# Patient Record
Sex: MALE | Race: WHITE | NOT HISPANIC OR LATINO | Employment: FULL TIME | URBAN - METROPOLITAN AREA
[De-identification: names, ages, dates, MRNs, and addresses within clinical notes are randomized per-mention and may not be internally consistent; named-entity substitution may affect disease eponyms.]

---

## 2025-01-08 ENCOUNTER — OFFICE VISIT (OUTPATIENT)
Dept: URGENT CARE | Age: 30
End: 2025-01-08
Payer: COMMERCIAL

## 2025-01-08 ENCOUNTER — ANCILLARY PROCEDURE (OUTPATIENT)
Dept: URGENT CARE | Age: 30
End: 2025-01-08
Payer: COMMERCIAL

## 2025-01-08 VITALS
OXYGEN SATURATION: 97 % | RESPIRATION RATE: 16 BRPM | BODY MASS INDEX: 26.11 KG/M2 | WEIGHT: 210 LBS | HEART RATE: 88 BPM | HEIGHT: 75 IN | TEMPERATURE: 98.4 F | SYSTOLIC BLOOD PRESSURE: 123 MMHG | DIASTOLIC BLOOD PRESSURE: 73 MMHG

## 2025-01-08 DIAGNOSIS — S49.91XA INJURY OF RIGHT SHOULDER, INITIAL ENCOUNTER: ICD-10-CM

## 2025-01-08 DIAGNOSIS — S43.401A SPRAIN OF RIGHT SHOULDER, UNSPECIFIED SHOULDER SPRAIN TYPE, INITIAL ENCOUNTER: ICD-10-CM

## 2025-01-08 DIAGNOSIS — S49.91XA INJURY OF RIGHT SHOULDER, INITIAL ENCOUNTER: Primary | ICD-10-CM

## 2025-01-08 RX ORDER — METHYLPREDNISOLONE 4 MG/1
TABLET ORAL
Qty: 21 TABLET | Refills: 0 | Status: SHIPPED | OUTPATIENT
Start: 2025-01-08 | End: 2025-01-14

## 2025-01-08 RX ORDER — TIZANIDINE HYDROCHLORIDE 4 MG/1
4 CAPSULE, GELATIN COATED ORAL 3 TIMES DAILY
Qty: 21 CAPSULE | Refills: 0 | Status: SHIPPED | OUTPATIENT
Start: 2025-01-08 | End: 2025-01-15

## 2025-01-08 ASSESSMENT — ENCOUNTER SYMPTOMS
ARTHRALGIAS: 1
WEAKNESS: 0
WOUND: 0
COLOR CHANGE: 0
MYALGIAS: 1
JOINT SWELLING: 0
NECK PAIN: 0
NUMBNESS: 1

## 2025-01-08 ASSESSMENT — PATIENT HEALTH QUESTIONNAIRE - PHQ9
1. LITTLE INTEREST OR PLEASURE IN DOING THINGS: NOT AT ALL
2. FEELING DOWN, DEPRESSED OR HOPELESS: NOT AT ALL
SUM OF ALL RESPONSES TO PHQ9 QUESTIONS 1 AND 2: 0

## 2025-01-08 NOTE — PATIENT INSTRUCTIONS
Muscle relaxers can make you drowsy, do not take if you have to work or drive, you can take before bed  Start steroids as instructed on the package, take with food and avoid use with ibuprofen/NSAIDs   Follow up with occupational health

## 2025-01-08 NOTE — PROGRESS NOTES
"Subjective   Patient ID: Mono Francis is a 29 y.o. male. They present today with a chief complaint of Worker's Compensation (Injured right shoulder on Dec. 28 felt a pop while opening a cooler door ).    History of Present Illness  Patient is a 29 year old male presenting for evaluation for right shoulder injury. Symptoms occurred on 12/28 while at work. Went to try and close a door but when he reached out felt a pop in his right anterior shoulder. Has had increasing pain since injury worse with flexion at right elbow feels a tingling in his bicep and pushing or lifting heavy objects. No previous injuries or surgeries to this arm. No issues/pain prior to this incident. No skin changes. Taking ibuprofen and ice without relief. Right hand dominant.       History provided by:  Patient   used: No        Past Medical History  Allergies as of 01/08/2025    (No Known Allergies)       (Not in a hospital admission)       Past Medical History:   Diagnosis Date    Personal history of other diseases of the circulatory system     History of cardiac murmur       Past Surgical History:   Procedure Laterality Date    OTHER SURGICAL HISTORY  12/03/2019    Cyst excision        reports that he has never smoked. He has never been exposed to tobacco smoke. He has never used smokeless tobacco.    Review of Systems  Review of Systems   Musculoskeletal:  Positive for arthralgias and myalgias. Negative for joint swelling and neck pain.   Skin:  Negative for color change and wound.   Neurological:  Positive for numbness. Negative for weakness.                                  Objective    Vitals:    01/08/25 1030   BP: 123/73   BP Location: Left arm   Patient Position: Sitting   BP Cuff Size: Adult   Pulse: 88   Resp: 16   Temp: 36.9 °C (98.4 °F)   TempSrc: Oral   SpO2: 97%   Weight: 95.3 kg (210 lb)   Height: 1.905 m (6' 3\")     No LMP for male patient.    Physical Exam  Constitutional:       General: He is not in " acute distress.     Appearance: Normal appearance. He is normal weight. He is not ill-appearing or toxic-appearing.   HENT:      Head: Normocephalic. No right periorbital erythema or left periorbital erythema.      Jaw: There is normal jaw occlusion. No trismus.   Eyes:      General: No scleral icterus.        Right eye: No discharge.         Left eye: No discharge.      Conjunctiva/sclera: Conjunctivae normal.   Neck:      Trachea: Phonation normal.   Cardiovascular:      Rate and Rhythm: Normal rate and regular rhythm.      Heart sounds: Normal heart sounds. No murmur heard.     No friction rub. No gallop.   Pulmonary:      Effort: Pulmonary effort is normal. No respiratory distress.      Breath sounds: Normal breath sounds. No stridor. No wheezing, rhonchi or rales.   Musculoskeletal:      Comments: Right upper extremity:   Tenderness over right anterior superior chest wall/shoulder and ac joint  No bony deformities   5/5 symmetrical strength of upper extremities   Abduction, adduction, flexion and extension of all digits intact as well as thumb opposition  Supination and pronation of forearm intact  Flexion and extension at elbow intact   Internal and external rotation of shoulder intact but with pain  Cross body adduction intact without pain    Neurological:      General: No focal deficit present.      Mental Status: He is alert.      Gait: Gait normal.   Psychiatric:         Mood and Affect: Mood normal.         Behavior: Behavior normal.         Thought Content: Thought content normal.         Procedures    Point of Care Test & Imaging Results from this visit  No results found for this visit on 01/08/25.   XR shoulder right 2+ views    Result Date: 1/8/2025  Interpreted By:  Reuben John, STUDY: XR SHOULDER RIGHT 2+ VIEWS;  1/8/2025 10:56 am   INDICATION: Signs/Symptoms:right ac joint tenderness.   COMPARISON: None.   ACCESSION NUMBER(S): [id number]   ORDERING CLINICIAN: RICARDO BURGESS   FINDINGS: No acute  fracture or dislocation. Joint spaces appear preserved.       No acute osseous injury of the right shoulder.   MACRO: None   Signed by: Reuben Dora 1/8/2025 11:02 AM Dictation workstation:   [id number]     Diagnostic study results (if any) were reviewed by Martina Beckham PA-C.    Assessment/Plan   Allergies, medications, history, and pertinent labs/EKGs/Imaging reviewed by Martina Beckham PA-C.     Medical Decision Making  Patient is a 29 year old male presenting for evaluation of a right shoulder injury with increasing pain. Hemodynamically stable. Exam as above, no deformity or limited ROM. Neurovascularly intact. XR without bony deformity or widening of the AC joint. Suspect shoulder sprain. Workers comp paperwork filled out. Placed in sling but discussed ROM exercises to prevent frozen shoulder. Follow up with occupational health for further management. Avoid heavy lifting at work/keep sling in place.     At time of discharge, patient was clinically well-appearing and appropriate for outpatient management. The patient/parent/guardian was educated regarding diagnosis, supportive care, OTC and Rx medications. The patient/parent/guardian was given the opportunity to ask questions prior to discharge. They verbalized understanding of discussion of treatment plan, expected course of illness and/or injury, indications on when to return to , when to seek further evaluation in ED/call 911, and the need to follow up with PCP and/or specialist as referred. Patient/parent/guardian was provided with work/school documentation if requested. Patient stable upon discharge.     Orders and Diagnoses  Diagnoses and all orders for this visit:  Injury of right shoulder, initial encounter  -     XR shoulder right 2+ views; Future  Sprain of right shoulder, unspecified shoulder sprain type, initial encounter  -     Sling  -     tiZANidine (Zanaflex) 4 mg capsule; Take 1 capsule (4 mg) by mouth 3 times a day for 7 days.  -      methylPREDNISolone (Medrol Dospak) 4 mg tablets; Follow schedule on package instructions      Medical Admin Record      Patient disposition: Home    Electronically signed by Martina Beckham PA-C  11:23 AM

## 2025-02-04 ENCOUNTER — EVALUATION (OUTPATIENT)
Dept: PHYSICAL THERAPY | Facility: CLINIC | Age: 30
End: 2025-02-04
Payer: COMMERCIAL

## 2025-02-04 DIAGNOSIS — S46.911A STRAIN OF UNSPECIFIED MUSCLE, FASCIA AND TENDON AT SHOULDER AND UPPER ARM LEVEL, RIGHT ARM, INITIAL ENCOUNTER: ICD-10-CM

## 2025-02-04 PROCEDURE — 97110 THERAPEUTIC EXERCISES: CPT | Mod: GP | Performed by: PHYSICAL THERAPIST

## 2025-02-04 PROCEDURE — 97161 PT EVAL LOW COMPLEX 20 MIN: CPT | Mod: GP | Performed by: PHYSICAL THERAPIST

## 2025-02-04 ASSESSMENT — ENCOUNTER SYMPTOMS
LOSS OF SENSATION IN FEET: 0
OCCASIONAL FEELINGS OF UNSTEADINESS: 0
DEPRESSION: 0

## 2025-02-04 ASSESSMENT — COLUMBIA-SUICIDE SEVERITY RATING SCALE - C-SSRS
1. IN THE PAST MONTH, HAVE YOU WISHED YOU WERE DEAD OR WISHED YOU COULD GO TO SLEEP AND NOT WAKE UP?: NO
6. HAVE YOU EVER DONE ANYTHING, STARTED TO DO ANYTHING, OR PREPARED TO DO ANYTHING TO END YOUR LIFE?: NO
2. HAVE YOU ACTUALLY HAD ANY THOUGHTS OF KILLING YOURSELF?: NO

## 2025-02-04 NOTE — PROGRESS NOTES
PHYSICAL THERAPY   EVALUATION & TREATMENT NOTE    Patient Name:  Mono Francis   Patient MRN: [id number]  Date: 2/4/2025    Time Calculation  Start Time: 1038  Stop Time: 1113  Time Calculation (min): 35 min    Insurance:  Insurance Type: [facility]  Visit number: 1  Approved # of visits 12  Authorization Needed: Yes  Cert Date Ends On: 3/14/25    General   Reason for visit: R shoulder strain   DOI 12/28/24  Referred by: Maikol Snyder  S46.911A    Therapy diagnoses:   Problem List Items Addressed This Visit             ICD-10-CM    Strain of unspecified muscle, fascia and tendon at shoulder and upper arm level, right arm, initial encounter S46.911A       ASSESSMENT   30 y/o RHD male c/o R shoulder pain from work injury on 12/28/24 presenting to PT today with decreased R shoulder ROM, decreased R UE strength, (+) Yergason's and symptoms consistent with biceps vs RTC strain. He is limited in his ability to lift anything, perform self care activities with right UE, carry out his tasks at work and perform other ADLs. Patient will benefit from skilled therapy to address these impairments and return to prior level of functioning.     PLAN   Goals  1. Pt will be independent in HEP in 6 weeks  2. Pt will report 0-2/10 R shoulder pain at rest and with activity in 6 weeks.  3. Pt will demonstrate 5/5 B UE strength to return to lifting at work in 6 weeks  4. Pt will demonstrate full R shoulder AROM to improve overhead reaching in 6 weeks.  5. Pt will report quick DASH score < 10% in 6 weeks.    Plan of care to include: therapeutic exercise, therapeutic activity, soft tissue mobilization, joint mobilizations, neuromuscular re-education, pt education, self care activities, home program, vaso/cryotherapy, dry needling    1-2x/week for 6 weeks.    Patient agrees to plan of care.    SUBJECTIVE   Reviewed medical history form with patient and medical screening assessed     Felt a pop in his R shoulder on 12/28/24 while shutting the walk in  cooler door. Shooting pain that went away but then a week later was worsening so he went to get x-rays that were normal.  Has a history of L RTC injury from putting in a dance floor, did 6 months of PT and returned to 100%.   Is RHD    Pain:  R anterior shoulder constant pain, with occasional N/T down biceps  At worst, pain is 6/10  Exacerbating factors include extending his arm, putting it behind his back  At best, pain is 2/10  Relieving factors include ice, ibuprofen as needed    Function:  Has been avoiding most of his housework, will carry his backpack which is only a few pounds. Has a hard time washing his back because he can't reach behind him.   Sleep - interrupted but better than it was, wakes him a few times a week    Work:  at a [employer]. Full time. 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 Is currently on restrictions - no heavy use of his arm or lifting. 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 Weightlifting, some cardio.     Pt goals: improve mobility    Language: English  Potential barriers to treatment: continue to assess    Precautions:    PMH: none  Fall risk -  none      OBJECTIVE *=painful  Observation/Posture  Forward head, rounded shoulders  (-) Cade deformity    Palpation  (+) TTP R biceps worse in long head tendon, pec     Sensation  Decreased to light touch R lateral arm/biceps    Active Range of Motion (R, L in degrees)  Cervical Flexion WNL  Cervical Extension WNL with slight lean to L   Cervical Sidebending WNL, WNL*  Cervical Rotation WNL, WNL*  Shoulder Flexion 115*, 163  Shoulder Abduction 127*, 160  Hand Up Back L2*, T6  Shoulder ER WNL, WNL    Strength (R, L MMT out of 5)  Shoulder Flexion 3*, 5  Shoulder Abduction 3*, 5  Shoulder IR 4, 5  Shoulder ER 4, 5  Elbow Flexion 4*, 5  Elbow Extension  5,  5  Wrist Supination 4*, 5    Special Tests  (+) Mary's    Outcome Measures  Quick DASH = 61%    Today's treatment and initial evaluation included:  - Patient education regarding diagnosis, prognosis, contributing factors, activity modification, symptom monitoring, importance of HEP, role of PT, postural re-education, work ergonomics, body mechanics, reviewed office cancel/no show policy.  - Review of POC   - Therapeutic Exercise & given HEP handout & green band:  Access Code: [email or web address]  - Seated Scapular Retraction  - 20 x daily - 10 reps - 10 sec hold  - Shoulder Flexion Wall Slide with Towel  - 2 x daily - 2 sets - 10 reps - 5 hold  - Door Pec stretch  - 2 x daily - 3 reps - 30 sec  hold  - Standing Shoulder Row with Anchored Resistance  - 1 x daily - 2 sets - 15 reps  - Shoulder extension with resistance - Neutral  - 1 x daily - 2 sets - 15 reps  PT Evaluation Time Entry  PT Evaluation (Low) Time Entry: 25  PT Therapeutic Procedures Time Entry  Therapeutic Exercise Time Entry: 10

## 2025-02-10 ENCOUNTER — TREATMENT (OUTPATIENT)
Dept: PHYSICAL THERAPY | Facility: CLINIC | Age: 30
End: 2025-02-10
Payer: COMMERCIAL

## 2025-02-10 DIAGNOSIS — S46.911A STRAIN OF UNSPECIFIED MUSCLE, FASCIA AND TENDON AT SHOULDER AND UPPER ARM LEVEL, RIGHT ARM, INITIAL ENCOUNTER: Primary | ICD-10-CM

## 2025-02-10 PROCEDURE — 97110 THERAPEUTIC EXERCISES: CPT | Mod: GP,CQ

## 2025-02-10 NOTE — PROGRESS NOTES
PHYSICAL THERAPY   TREATMENT NOTE    Patient Name:  Mono Francis   Patient MRN: [id number]  Date: 2/10/2025    Time Calculation  Start Time: 1145  Stop Time: 1230  Time Calculation (min): 45 min    Insurance:  Insurance Type: [facility]  Visit number: 2    Approved # of visits 12  Authorization Needed: Yes  Cert Date Ends On: 03/14/25    General   Reason for visit: R shoulder strain   DOI 12/28/24  Referred by: Maikol Snyder  S46.911A    Therapy diagnoses:   Problem List Items Addressed This Visit             ICD-10-CM    Strain of unspecified muscle, fascia and tendon at shoulder and upper arm level, right arm, initial encounter - Primary S46.911A     Assessment:    Patient presented to session with 3.5-4/10 pain in the R upper arm region. Indicated increased irritability with doorway pec stretches in 90/90 and OH, but low doorway stretch was more tolerable. Able to begin resisted shoulder ER/IR with moderate fatigue. Did trial increased theraband resistance for mid rows and B shoulder extension with verbal cues for proper posture and technique with slightly elevated irritability overall. Initiated wand IR to increase ROM with good tolerance. Declined ice post treatment. Updated HEP.    Plan:  Continue to address R UE ROM, flexibility, and strength as tolerated.    Subjective:  Patient states he has been feeling better and making progress; yesterday has numbness and tingling in the whole hand - mostly the palm, lasted at least 5 minutes then went away. Has difficulty with getting his hands behind his back to wash.  - Pain (0-10): 0/10 this morning; 3.5-4/10 currently has a good pain tolerance     Precautions:  PMH: none  Fall Risk: none    Objective:    Treatment Performed:   Therapeutic Exercise:  - Access Code: [email or web address]  - UBE : L3 3' fwd/3' retro  - Seated Scapular Retraction 10 x 10 sec hold  - Pulleys fwd/abd x 2 minutes ea  - Shoulder Flexion Wall Slide with Towel 2 x 10 - 5 sec hold  - Door Pec stretch 3 x 30 sec  hold ( low less irritable vs mid/high)   - Shoulder IR with wand 10 x 5 sec hold  - Standing Shoulder Rows 2 x 15 blue  - Shoulder extension 2 x 10 blue  - Shoulder ER/IR 2 x 10 green    Manual Therapy:     Neuromuscular Re-education:     Gait Training:      Modalities: minutes       PT Therapeutic Procedures Time Entry  Therapeutic Exercise Time Entry: 45

## 2025-02-12 ENCOUNTER — TREATMENT (OUTPATIENT)
Dept: PHYSICAL THERAPY | Facility: CLINIC | Age: 30
End: 2025-02-12
Payer: COMMERCIAL

## 2025-02-12 DIAGNOSIS — S46.911A STRAIN OF UNSPECIFIED MUSCLE, FASCIA AND TENDON AT SHOULDER AND UPPER ARM LEVEL, RIGHT ARM, INITIAL ENCOUNTER: ICD-10-CM

## 2025-02-12 PROCEDURE — 97110 THERAPEUTIC EXERCISES: CPT | Mod: GP,CQ

## 2025-02-12 NOTE — PROGRESS NOTES
PHYSICAL THERAPY   TREATMENT NOTE    Patient Name:  Mono Francis   Patient MRN: [id number]  Date: 2/12/2025    Time Calculation  Start Time: 1215  Stop Time: 1300  Time Calculation (min): 45 min    Insurance:  Insurance Type: [facility]  Visit number: 3    Approved # of visits 12  Authorization Needed: Yes  Cert Date Ends On: 03/14/25    General   Reason for visit: R shoulder strain   DOI 12/28/24  Referred by: Maikol Snyder  S46.911A    Therapy diagnoses:   Problem List Items Addressed This Visit             ICD-10-CM    Strain of unspecified muscle, fascia and tendon at shoulder and upper arm level, right arm, initial encounter S46.911A     Assessment:    Patient presented to session with 4/10 pain in the R upper arm region/shoulder. Displayed increased muscular fatigue during theraband strengthening. Initiated shoulder isometric flex and abduction at the wall with verbal cues for sub-max pressure and to not use body to lean into the pressure with slightly increased irritability in abd vs flex. Updated HEP.    Plan:  Continue to address R UE ROM, flexibility, and strength as tolerated.    Subjective:  Patient states he was sore after last session the rest of the day in the R shoulder.  - Pain (0-10): 0/10 this morning; 4/10 currently R upper arm/shoulder    Precautions:  PMH: none  Fall Risk: none    Objective:    Treatment Performed:   Therapeutic Exercise:  - Access Code: [email or web address]  - UBE : L3 3' fwd/3' retro  - Seated Scapular Retraction 10 x 10 sec hold  - Pulleys fwd/abd x 2 minutes ea  - Shoulder Flexion Wall Slide with Towel 2 x 10 - 5 sec hold  - Door Pec stretch low 3 x 30 sec hold  - Shoulder IR with wand 10 x 5 sec hold  - Standing Shoulder Rows 2 x 10 blue  - Shoulder extension 2 x 10 blue  - Shoulder ER/IR 2 x 10 green    Manual Therapy:     Neuromuscular Re-education:     Gait Training:      Modalities: minutes       PT Therapeutic Procedures Time Entry  Therapeutic Exercise Time Entry: 45

## 2025-02-18 ENCOUNTER — TREATMENT (OUTPATIENT)
Dept: PHYSICAL THERAPY | Facility: CLINIC | Age: 30
End: 2025-02-18
Payer: COMMERCIAL

## 2025-02-18 DIAGNOSIS — S46.911A STRAIN OF UNSPECIFIED MUSCLE, FASCIA AND TENDON AT SHOULDER AND UPPER ARM LEVEL, RIGHT ARM, INITIAL ENCOUNTER: ICD-10-CM

## 2025-02-18 PROCEDURE — 97140 MANUAL THERAPY 1/> REGIONS: CPT | Mod: GP | Performed by: PHYSICAL THERAPIST

## 2025-02-18 PROCEDURE — 97016 VASOPNEUMATIC DEVICE THERAPY: CPT | Mod: GP | Performed by: PHYSICAL THERAPIST

## 2025-02-18 PROCEDURE — 97110 THERAPEUTIC EXERCISES: CPT | Mod: GP | Performed by: PHYSICAL THERAPIST

## 2025-02-18 NOTE — PROGRESS NOTES
PHYSICAL THERAPY   TREATMENT NOTE    Patient Name:  Mono Francis   Patient MRN: [id number]  Date: 2/18/2025    Time Calculation  Start Time: 0902  Stop Time: 0943  Time Calculation (min): 41 min    Insurance:  Insurance Type: [facility]  Visit number: 4    Approved # of visits 12  Authorization Needed: Yes  Cert Date Ends On: 03/14/25    General   Reason for visit: R shoulder strain   DOI 12/28/24  Referred by: Maikol Snyder  S46.911A    Therapy diagnoses:   Problem List Items Addressed This Visit             ICD-10-CM    Strain of unspecified muscle, fascia and tendon at shoulder and upper arm level, right arm, initial encounter S46.911A     Assessment:    Pt has poor tolerance today for exercise and manual with TTP around anterior R shoulder and pain at end ranges flexion/abduction/ER/IR. No improvement after session today.    Plan:  Continue to address R UE ROM, flexibility, and strength as tolerated.      Subjective:  Pt says that he saw the doctor who thinks his mobility is getting worse but pt states he doesn't feel like it's getting worse. Is going to get an MRI. Still has a hard time reaching behind his back.   - Pain (0-10): 5/10 this morning, the weather makes it worse.    Precautions:  PMH: none  Fall Risk: none    Objective:  - Access Code: [email or web address]  Treatment Performed:   Therapeutic Exercise:  - UBE L3 x 3' for / 3' rev  - pulleys x 3' abd / 3' flex    Manual Therapy  STM R pec, subscap, UT, LS  PROM R shoulder     Modalities  Vaso 34 degrees mod compression R shoulder x 10'       PT Therapeutic Procedures Time Entry  Manual Therapy Time Entry: 19  Therapeutic Exercise Time Entry: 12  PT Modalities Time Entry  Vasopneumatic Devices Time Entry: 10               no

## 2025-02-21 ENCOUNTER — TREATMENT (OUTPATIENT)
Dept: PHYSICAL THERAPY | Facility: CLINIC | Age: 30
End: 2025-02-21
Payer: COMMERCIAL

## 2025-02-21 DIAGNOSIS — S46.911A STRAIN OF UNSPECIFIED MUSCLE, FASCIA AND TENDON AT SHOULDER AND UPPER ARM LEVEL, RIGHT ARM, INITIAL ENCOUNTER: ICD-10-CM

## 2025-02-21 PROCEDURE — 97110 THERAPEUTIC EXERCISES: CPT | Mod: GP | Performed by: PHYSICAL THERAPIST

## 2025-02-21 PROCEDURE — 97016 VASOPNEUMATIC DEVICE THERAPY: CPT | Mod: GP | Performed by: PHYSICAL THERAPIST

## 2025-02-21 NOTE — PROGRESS NOTES
PHYSICAL THERAPY   TREATMENT NOTE    Patient Name:  Mono Francis   Patient MRN: [id number]  Date: 2/21/2025    Time Calculation  Start Time: 1115  Stop Time: 1200  Time Calculation (min): 45 min    Insurance:  Insurance Type: [facility]  Visit number: 5    Approved # of visits 12  Authorization Needed: Yes  Cert Date Ends On: 03/14/25    General   Reason for visit: R shoulder strain   DOI 12/28/24  Referred by: Maikol Snyder, APRN-CNP  S46.911A    Therapy diagnoses:   Problem List Items Addressed This Visit             ICD-10-CM       Other    Strain of unspecified muscle, fascia and tendon at shoulder and upper arm level, right arm, initial encounter S46.911A       Assessment:  Rotator cuff strengthening with moderate fatigue. Theraband resistance for mid rows and B shoulder extension with verbal cues for proper posture and technique with mildly elevated irritability.     Plan: Continue to address R UE ROM, flexibility, and strength as tolerated.      Subjective:    Pt's symptoms are mostly unchanged. He has difficulty reaching overhead and behind his back.     Patient is schedule for MRI on 2/28/25.    - Pain (0-10): Right anterior shoulder    Precautions:  PMH: none  Fall Risk: none    Objective:  - Access Code: [email or web address]      Treatment Performed:   Therapeutic Exercise:  [35 min]    - UBE L3 x 3' for / 3' rev  - pulleys x 3' abd / 3' flex  - Pulleys fwd/abd x 2 minutes ea  - Shoulder ER/IR 2 x 10 green  - Shoulder Flexion Wall Slide with Towel 2 x 10 - 5 sec hold  - Shoulder Scaption Wall Slide with Towel 2 x 10 - 5 sec hold  - Standing Shoulder Rows 2 x 15 blue  - Shoulder extension 3 x 10 blue        Manual Therapy  STM R pec, subscap, UT, LS  PROM R shoulder     Modalities [10 min]  Vaso 34 degrees mod compression R shoulder x 10'       PT Therapeutic Procedures Time Entry  Therapeutic Exercise Time Entry: 35  PT Modalities Time Entry  Vasopneumatic Devices Time Entry: 10

## 2025-02-24 ENCOUNTER — TREATMENT (OUTPATIENT)
Dept: PHYSICAL THERAPY | Facility: CLINIC | Age: 30
End: 2025-02-24
Payer: COMMERCIAL

## 2025-02-24 DIAGNOSIS — S46.911A STRAIN OF UNSPECIFIED MUSCLE, FASCIA AND TENDON AT SHOULDER AND UPPER ARM LEVEL, RIGHT ARM, INITIAL ENCOUNTER: ICD-10-CM

## 2025-02-26 ENCOUNTER — TREATMENT (OUTPATIENT)
Dept: PHYSICAL THERAPY | Facility: CLINIC | Age: 30
End: 2025-02-26
Payer: COMMERCIAL

## 2025-02-26 DIAGNOSIS — S46.911A STRAIN OF UNSPECIFIED MUSCLE, FASCIA AND TENDON AT SHOULDER AND UPPER ARM LEVEL, RIGHT ARM, INITIAL ENCOUNTER: ICD-10-CM

## 2025-02-26 PROCEDURE — 97140 MANUAL THERAPY 1/> REGIONS: CPT | Mod: GP,CQ

## 2025-02-26 PROCEDURE — 97110 THERAPEUTIC EXERCISES: CPT | Mod: GP,CQ

## 2025-02-26 PROCEDURE — 97016 VASOPNEUMATIC DEVICE THERAPY: CPT | Mod: GP,CQ

## 2025-02-26 NOTE — PROGRESS NOTES
PHYSICAL THERAPY   TREATMENT NOTE    Patient Name:  Mono Francis   Patient MRN: [id number]  Date: 2/26/2025    Time Calculation  Start Time: 0915  Stop Time: 1003  Time Calculation (min): 48 min    Insurance:  Insurance Type: [facility]  Visit number: 6    Approved # of visits 12  Authorization Needed: Yes  Cert Date Ends On: 03/14/25    General   Reason for visit: R shoulder strain   DOI 12/28/24  Referred by: Maikol Snyder, APRN-CNP  S46.911A    Therapy diagnoses:   Problem List Items Addressed This Visit             ICD-10-CM    Strain of unspecified muscle, fascia and tendon at shoulder and upper arm level, right arm, initial encounter S46.911A         Assessment:  Significant point tenderness presented within the R proximal biceps region along with trigger points in the deltoid, biceps, levator, and UT this date. Indicated increased irritability during manual interventions today. Poor tolerance to exercise this date due to higher pain level since waking up this morning; modified theraband resistance and reps to tolerance. Applied game ready to R shoulder to try to decrease pain post exercise.    Plan:   Continue to address R UE ROM, flexibility, and strength as tolerated.    Subjective:  Patient states he thinks he slept on it wrong last night because he has a lot of pain this morning about 7/10 that goes from the front of the shoulder over the top to the back. Patient is scheduled for MRI on 2/28/25.    - Pain (0-10): 7/10 Right anterior shoulder and over the top to the posterior aspect    Precautions:  PMH: none  Fall Risk: none    Objective:  - Access Code: [email or web address]      Treatment Performed:   Therapeutic Exercise:  [30 min]    - UBE L3 x 3' for / 3' rev  - pulleys abd / flex 2' ea  - Shoulder ER/IR 2 x 10 green  - Shoulder Flexion Wall Slide with Towel 2 x 10 - 5 sec hold  - Shoulder Scaption Wall Slide with Towel 2 x 10 - 5 sec hold  - Standing Shoulder Rows 2 x 10 green  - Shoulder extension 2 x 10  green    Manual Therapy: [ 8 min ]  STM/TPR R pec, subscap, UT, LS  PROM R shoulder     Modalities [10 min]  Vaso 34 degrees mod compression R shoulder x 10'       PT Therapeutic Procedures Time Entry  Manual Therapy Time Entry: 8  Therapeutic Exercise Time Entry: 30  PT Modalities Time Entry  Vasopneumatic Devices Time Entry: 10

## 2025-02-28 ENCOUNTER — HOSPITAL ENCOUNTER (OUTPATIENT)
Dept: RADIOLOGY | Facility: HOSPITAL | Age: 30
Discharge: HOME | End: 2025-02-28
Payer: COMMERCIAL

## 2025-02-28 DIAGNOSIS — S46.911A STRAIN OF UNSPECIFIED MUSCLE, FASCIA AND TENDON AT SHOULDER AND UPPER ARM LEVEL, RIGHT ARM, INITIAL ENCOUNTER: ICD-10-CM

## 2025-02-28 PROCEDURE — 73221 MRI JOINT UPR EXTREM W/O DYE: CPT | Mod: RT

## 2025-03-03 ENCOUNTER — TREATMENT (OUTPATIENT)
Dept: PHYSICAL THERAPY | Facility: CLINIC | Age: 30
End: 2025-03-03
Payer: COMMERCIAL

## 2025-03-03 DIAGNOSIS — S46.911A STRAIN OF UNSPECIFIED MUSCLE, FASCIA AND TENDON AT SHOULDER AND UPPER ARM LEVEL, RIGHT ARM, INITIAL ENCOUNTER: ICD-10-CM

## 2025-03-03 PROCEDURE — 97140 MANUAL THERAPY 1/> REGIONS: CPT | Mod: GP,CQ

## 2025-03-03 PROCEDURE — 97110 THERAPEUTIC EXERCISES: CPT | Mod: GP,CQ

## 2025-03-03 PROCEDURE — 97016 VASOPNEUMATIC DEVICE THERAPY: CPT | Mod: GP,CQ

## 2025-03-03 NOTE — PROGRESS NOTES
"PHYSICAL THERAPY   TREATMENT NOTE    Patient Name:  Mono Francis   Patient MRN: [id number]  Date: 3/3/2025    Time Calculation  Start Time: 0830  Stop Time: 0925  Time Calculation (min): 55 min    Insurance:  Insurance Type: [facility]  Visit number: 7    Approved # of visits 12  Authorization Needed: Yes  Cert Date Ends On: 03/14/25    General   Reason for visit: R shoulder strain   DOI 12/28/24  Referred by: Maikol Snyder, APRN-CNP  S46.911A    Therapy diagnoses:   Problem List Items Addressed This Visit             ICD-10-CM    Strain of unspecified muscle, fascia and tendon at shoulder and upper arm level, right arm, initial encounter S46.911A       Assessment:  Patient presented to session with decreasing pain levels vs last session. Able to progress with wall push ups without issue. Increased theraband resistance for RTC strengthening with slight irritability of the R shoulder, but tolerated strengthening progressions well. Initiated prone scapular strengthening with mild to moderate muscular fatigue. Mostly limited with PROM abduction due to pain. Game ready applied post session to decrease exercise soreness.    Plan:   Continue to address R UE ROM, flexibility, and strength as tolerated.    Subjective:  Patient states he has his MRI and nothing major is going on in there, just inflammation. Notices his mobility is getting better. Has an ortho follow up tomorrow.    - Pain (0-10): 3/10 Right anterior shoulder and over the top to the posterior aspect    Precautions:  PMH: none  Fall Risk: none    Objective:  - Access Code: [email or web address]    Treatment Performed:   Therapeutic Exercise:  [35 min]    - UBE L3 x 3' for / 3' rev  - pulleys abd / flex 2' ea  - wall push ups 2 x 10  - Shoulder ER/IR 2 x 10 green  - Shoulder Flexion/Scaption Wall Slides w/Towel 2 x 10 - 5 sec hold  - Mid rows to neutral 2 x 10 black  - Shoulder extension 2 x 10 blue  - prone I and T 3\" 2 x 10 ea  - side lying alphabet x 1    Manual Therapy: [ 10 " min ]  STM/TPR R pec, subscap, UT, LS  PROM R shoulder     Modalities [10 min]  Vaso 34 degrees mod compression R shoulder x 10'       PT Therapeutic Procedures Time Entry  Manual Therapy Time Entry: 10  Therapeutic Exercise Time Entry: 35  PT Modalities Time Entry  Vasopneumatic Devices Time Entry: 10

## 2025-03-04 ENCOUNTER — OFFICE VISIT (OUTPATIENT)
Dept: ORTHOPEDIC SURGERY | Facility: CLINIC | Age: 30
End: 2025-03-04
Payer: COMMERCIAL

## 2025-03-04 VITALS — HEIGHT: 75 IN | BODY MASS INDEX: 26.11 KG/M2 | WEIGHT: 210 LBS

## 2025-03-04 DIAGNOSIS — S46.911A STRAIN OF RIGHT UPPER ARM, INITIAL ENCOUNTER: Primary | ICD-10-CM

## 2025-03-04 PROCEDURE — 99213 OFFICE O/P EST LOW 20 MIN: CPT | Performed by: ORTHOPAEDIC SURGERY

## 2025-03-04 NOTE — LETTER
March 4, 2025     Bin Weston MD  [address]  [facility] [address]    Patient: Mono Francis   YOB: 1995   Date of Visit: 3/4/2025       Dear Dr. Bin Weston MD:    Thank you for referring Mono Francis to me for evaluation. Below are my notes for this consultation.  If you have questions, please do not hesitate to call me. I look forward to following your patient along with you.       Sincerely,     Farooq Barrios MD      CC: No Recipients  ______________________________________________________________________________________    29-year-old referred in consultation from [facility] source.  He injured the right shoulder at work on December 28, 2024 when he was opening a cooler and felt a pop in the shoulder.  He is an .  He has been performing physical therapy with some improvement.  He has been taking ibuprofen and applying ice.  He has difficulty sleeping.    Pleasant and no acute distress.  Right shoulder forward flexion 160°. No effusion or instability. There is positive Neer and Villareal impingement. There is subacromial crepitus and tenderness around the subacromial space. There is biceps tenderness. There is a positive [place]'s test. No acromioclavicular tenderness. Rotator cuff strength is intact but there is discomfort with strength testing. Left shoulder forward flexion 180°. No effusion or instability. No impingement or crepitus or tenderness involving the subacromial space. No biceps or acromioclavicular tenderness. There is intact rotator cuff strength. There is adequate range of motion of the cervical spine without pain. Both upper extremities are well perfused, skin is intact and muscle tone is adequate. Elbow flexion and extension and wrist flexion and extension strength are intact.    Multiple x-ray views of the right shoulder are personally reviewed and there is no acute bony abnormality.    MRI  of the right shoulder is personally reviewed and there is subacromial bursitis.  The rotator cuff is intact.    A discussion about rotator cuff tendinitis was done.  Treatment options were reviewed.  He should continue with further formal physical therapy twice a week over the next 8 weeks as he has been improving and needs additional physical therapy to be able to return to all of the functional demands of his job.  He can use ibuprofen.  If he is having persistent symptoms cortisone injection may also be helpful.

## 2025-03-04 NOTE — PROGRESS NOTES
29-year-old referred in consultation from [facility] source.  He injured the right shoulder at work on December 28, 2024 when he was opening a cooler and felt a pop in the shoulder.  He is an .  He has been performing physical therapy with some improvement.  He has been taking ibuprofen and applying ice.  He has difficulty sleeping.    Pleasant and no acute distress.  Right shoulder forward flexion 160°. No effusion or instability. There is positive Neer and Villareal impingement. There is subacromial crepitus and tenderness around the subacromial space. There is biceps tenderness. There is a positive [place]'s test. No acromioclavicular tenderness. Rotator cuff strength is intact but there is discomfort with strength testing. Left shoulder forward flexion 180°. No effusion or instability. No impingement or crepitus or tenderness involving the subacromial space. No biceps or acromioclavicular tenderness. There is intact rotator cuff strength. There is adequate range of motion of the cervical spine without pain. Both upper extremities are well perfused, skin is intact and muscle tone is adequate. Elbow flexion and extension and wrist flexion and extension strength are intact.    Multiple x-ray views of the right shoulder are personally reviewed and there is no acute bony abnormality.    MRI of the right shoulder is personally reviewed and there is subacromial bursitis.  The rotator cuff is intact.    A discussion about rotator cuff tendinitis was done.  Treatment options were reviewed.  He should continue with further formal physical therapy twice a week over the next 8 weeks as he has been improving and needs additional physical therapy to be able to return to all of the functional demands of his job.  He can use ibuprofen.  If he is having persistent symptoms cortisone injection may also be helpful.

## 2025-03-05 ENCOUNTER — TREATMENT (OUTPATIENT)
Dept: PHYSICAL THERAPY | Facility: CLINIC | Age: 30
End: 2025-03-05
Payer: COMMERCIAL

## 2025-03-05 DIAGNOSIS — S46.911A STRAIN OF UNSPECIFIED MUSCLE, FASCIA AND TENDON AT SHOULDER AND UPPER ARM LEVEL, RIGHT ARM, INITIAL ENCOUNTER: ICD-10-CM

## 2025-03-05 PROCEDURE — 97140 MANUAL THERAPY 1/> REGIONS: CPT | Mod: GP,CQ

## 2025-03-05 PROCEDURE — 97110 THERAPEUTIC EXERCISES: CPT | Mod: GP,CQ

## 2025-03-05 NOTE — PROGRESS NOTES
PHYSICAL THERAPY   TREATMENT NOTE    Patient Name:  Mono Francis   Patient MRN: [id number]  Date: 3/5/2025    Time Calculation  Start Time: 0915  Stop Time: 1000  Time Calculation (min): 45 min    Insurance:  Insurance Type: [facility]  Visit number: 8    Approved # of visits 12  Authorization Needed: Yes  Cert Date Ends On: 03/14/25    General   Reason for visit: R shoulder strain   DOI 12/28/24  Referred by: Maikol Snyder, ISABEL-CNP  S46.911A    Therapy diagnoses:   Problem List Items Addressed This Visit             ICD-10-CM    Strain of unspecified muscle, fascia and tendon at shoulder and upper arm level, right arm, initial encounter S46.911A     Assessment:  Patient presented to session with minimal pain since waking up before session; pain and irritability does increase with exercise in the posterolateral shoulder. Able to progress with serratus wall slides with verbal cues for scapular stability. Prone scapular strengthening is still challenging resulting in mild to moderate muscular fatigue. Had more irritability with PROM shoulder ER and abduction this date. Declined modalities to home.    Plan:   Continue to address R UE ROM, flexibility, and strength as tolerated.    Subjective:  Patient states he saw the ortho and wants more visits for PT and possibly a cortisone injection if symptoms persist. Mild pain so far this morning as he just woke up before he got here.    - Pain (0-10): 1/10 Right anterior shoulder and over the top to the posterior aspect    Precautions:  PMH: none  Fall Risk: none    Objective:  - Access Code: [email or web address]    Treatment Performed:   Therapeutic Exercise:  [35 min]    - UBE L3 x 3' for / 3' rev  - pulleys abd / flex 2' ea  - wall push ups 2 x 10  - pink med ball flexion roll up/down wall 2 x 10  - serratus slides 2 x 10  - Shoulder ER/IR 2 x 10 green  - Wall slides 3 directions with 2# ankle weight 2 x 10  - Mid rows to neutral 2 x 10 black  - Shoulder extension 2 x 10 blue  - prone I  "and T 3\" 2 x 10 ea  - side lying alphabet x 1    Manual Therapy: [ 10 min ]  STM/TPR R pec, subscap, UT, LS  PROM R shoulder     Modalities [0 min]  Vaso 34 degrees mod compression R shoulder x 10' -- declined this date       PT Therapeutic Procedures Time Entry  Manual Therapy Time Entry: 8  Therapeutic Exercise Time Entry: 37                 "

## 2025-03-10 ENCOUNTER — TREATMENT (OUTPATIENT)
Dept: PHYSICAL THERAPY | Facility: CLINIC | Age: 30
End: 2025-03-10
Payer: COMMERCIAL

## 2025-03-10 DIAGNOSIS — S46.911A STRAIN OF UNSPECIFIED MUSCLE, FASCIA AND TENDON AT SHOULDER AND UPPER ARM LEVEL, RIGHT ARM, INITIAL ENCOUNTER: ICD-10-CM

## 2025-03-10 PROCEDURE — 97140 MANUAL THERAPY 1/> REGIONS: CPT | Mod: GP,CQ

## 2025-03-10 PROCEDURE — 97110 THERAPEUTIC EXERCISES: CPT | Mod: GP,CQ

## 2025-03-10 NOTE — PROGRESS NOTES
PHYSICAL THERAPY   TREATMENT NOTE    Patient Name:  Mono Francis   Patient MRN: [id number]  Date: 3/10/2025    Time Calculation  Start Time: 0915  Stop Time: 1000  Time Calculation (min): 45 min    Insurance:  Insurance Type: [facility]  Visit number: 9    Approved # of visits 12  Authorization Needed: Yes  Cert Date Ends On: 03/14/25    General   Reason for visit: R shoulder strain   DOI 12/28/24  Referred by: Maikol Snyder, ISABEL-CNP  S46.911A    Therapy diagnoses:   Problem List Items Addressed This Visit             ICD-10-CM    Strain of unspecified muscle, fascia and tendon at shoulder and upper arm level, right arm, initial encounter S46.911A     Assessment:  Patient presented to session with slightly increased pain in the R anterior shoulder. Progressed with B shoulder ER and B shoulder horizontal abd to promote postural strength with some irritability during shoulder adduction to neutral. Able to add resisted theraband to serratus wall slide with lift off. Completed side lying alphabet without increased pain. Still has increased soreness with PROM shoulder ER and abduction. Declined modalities to home.    Plan:   Continue to address R UE ROM, flexibility, and strength as tolerated.    Subjective:  Patient states he has slightly increased pain this morning between 2-4/10 in the R shoulder.    - Pain (0-10): 2-4/10 Right anterior shoulder and over the top to the posterior aspect    Precautions:  PMH: none  Fall Risk: none    Objective:  - Access Code: [email or web address]    Treatment Performed:   Therapeutic Exercise:  [37 min]    - UBE L3 x 3' for / 3' rev  - pulleys abd / flex 2' ea  - wall push ups 2 x 10  - pink med ball flexion roll up/down wall 2 x 10  - serratus slides 2 x 10 red  - Shoulder ER/IR 2 x 10 green  - B shoulder horizontal abd x 10 blue  - B shoulder ER 2 x 10 blue  - Wall slides 3 directions with 2# ankle weight 2 x 10  - Mid rows to neutral 2 x 10 black  - Shoulder extension 2 x 10 blue  - prone I and T  "3\" 2 x 10 ea  - side lying alphabet x 1    Manual Therapy: [ 8 min ]  STM/TPR R pec, subscap, UT, LS  PROM R shoulder     Modalities [0 min]  Vaso 34 degrees mod compression R shoulder x 10' -- declined this date       PT Therapeutic Procedures Time Entry  Manual Therapy Time Entry: 8  Therapeutic Exercise Time Entry: 37                 "

## 2025-03-12 ENCOUNTER — APPOINTMENT (OUTPATIENT)
Dept: PHYSICAL THERAPY | Facility: CLINIC | Age: 30
End: 2025-03-12
Payer: COMMERCIAL

## 2025-04-01 ENCOUNTER — APPOINTMENT (OUTPATIENT)
Dept: PHYSICAL THERAPY | Facility: CLINIC | Age: 30
End: 2025-04-01
Payer: COMMERCIAL

## 2025-04-04 ENCOUNTER — TREATMENT (OUTPATIENT)
Dept: PHYSICAL THERAPY | Facility: CLINIC | Age: 30
End: 2025-04-04
Payer: COMMERCIAL

## 2025-04-04 DIAGNOSIS — S46.911A STRAIN OF UNSPECIFIED MUSCLE, FASCIA AND TENDON AT SHOULDER AND UPPER ARM LEVEL, RIGHT ARM, INITIAL ENCOUNTER: ICD-10-CM

## 2025-04-04 PROCEDURE — 97110 THERAPEUTIC EXERCISES: CPT | Mod: GP | Performed by: PHYSICAL THERAPIST

## 2025-04-04 PROCEDURE — 97164 PT RE-EVAL EST PLAN CARE: CPT | Mod: GP | Performed by: PHYSICAL THERAPIST

## 2025-04-04 PROCEDURE — 97016 VASOPNEUMATIC DEVICE THERAPY: CPT | Mod: GP | Performed by: PHYSICAL THERAPIST

## 2025-04-04 NOTE — PROGRESS NOTES
PHYSICAL THERAPY   TREATMENT NOTE/PT REASSESSMENT    Patient Name:  Mono Francis   Patient MRN: [id number]  Date: 4/4/2025    Time Calculation  Start Time: 0735  Stop Time: 0817  Time Calculation (min): 42 min    Insurance:  Insurance Type: [facility]  Visit number: 10    Approved # of visits 12  Authorization Needed: Yes  Cert Date Ends On: 03/14/25  PT Reassessment: 4/4/25    General   Reason for visit: R shoulder strain   DOI 12/28/24  Referred by: Maikol Snyder, APRN-CNP  S46.911A    Therapy diagnoses:   Problem List Items Addressed This Visit             ICD-10-CM    Strain of unspecified muscle, fascia and tendon at shoulder and upper arm level, right arm, initial encounter S46.911A       Assessment:  Pt demo's progress towards improving overall AROM, but with weakness with ER/IR with ABD. In addition, pt's functional ability has improved based on QuickDash score. Pt's limitations persist with strength and functional activities with ER especially. Pt reports that shoulder felt better after application of vasopneumatic compression to shoulder.     Goal Assessment:    STG: (new goal added)  Pt will report ability to use R arm for OH activities with full range and with reports of pain <2/10 to meet pt's personal goal in 6 weeks.     LTG: (updated)  1. Pt will be independent in HEP in 6 weeks. Extend 6 more weeks-partially met, pt is independent with HEP at this time  2. Pt will report 0-2/10 R shoulder pain at rest and with activity in 6 weeks. Extend 6 more weeks-partially met, pain has improved, but occasional spikes with R shoulder ABD  3. Pt will demonstrate 5/5 B UE strength to return to lifting at work in 6 weeks-partially met, pt notices with repetitive motion/lifting his shoulder is very sore and weak.  4. Pt will demonstrate full R shoulder AROM to improve overhead reaching in 6 weeks. Extend 6 more weeks-partially met, pt has improved flexion and ABD in supine, but restrictions in ER/IR persist especially  "with ABD.   5. Pt will report quick DASH score < 10% in 6 weeks. Extend 6 more weeks-partially met, pt has improved QuickDash from 61% to 25% of disability.       Plan:   Continue to address R UE ROM, flexibility, and strength as tolerated.    Subjective:  Patient states he has slightly increased pain this morning between 2-4/10 in the R shoulder.    - Pain (0-10): 2-4/10 Right anterior shoulder and over the top to the posterior aspect    Precautions:  PMH: none  Fall Risk: none    Objective:  Outcome Measure: QuickDash 22= 25%    Supine Shoulder AROM:  At Eval in standing:  Shoulder Flexion 115*, 163  Shoulder Abduction 127*, 160  Hand Up Back L2*, T6  Shoulder ER WNL, WNL     At Reassessment in supine:  R shoulder flexion: 160 degrees with empty end feel  R shoulder ABD: 154 degrees with empty end feel'  R shoulder ER with ABD to 90: 10 degrees with empty end feel  R shoulder ER with ABD to 45: 80 degrees with empty end feel    Standing R shoulder IR: T11 at back      Treatment Performed:   Therapeutic Exercise:   - Access Code: [email or web address]  - pulleys abd / flex 3' ea  - UBE L3 x 2' for / 2' rev  - wall push ups 2 x 10    Not today:  - pink med ball flexion roll up/down wall 2 x 10  - serratus slides 2 x 10 red  - Shoulder ER/IR 2 x 10 green  - B shoulder horizontal abd x 10 blue  - B shoulder ER 2 x 10 blue  - Wall slides 3 directions with 2# ankle weight 2 x 10  - Mid rows to neutral 2 x 10 black  - Shoulder extension 2 x 10 blue  - prone I and T 3\" 2 x 10 ea  - side lying alphabet x 1    Modalities  Vasopneumatic compression to R shoulder x 10 min in sitting    PT Evaluation Time Entry  PT Re-Evaluation Time Entry: 15  PT Therapeutic Procedures Time Entry  Therapeutic Exercise Time Entry: 20  PT Modalities Time Entry  Vasopneumatic Devices Time Entry: 10              "

## 2025-04-08 ENCOUNTER — TREATMENT (OUTPATIENT)
Dept: PHYSICAL THERAPY | Facility: CLINIC | Age: 30
End: 2025-04-08
Payer: COMMERCIAL

## 2025-04-08 DIAGNOSIS — S46.911A STRAIN OF UNSPECIFIED MUSCLE, FASCIA AND TENDON AT SHOULDER AND UPPER ARM LEVEL, RIGHT ARM, INITIAL ENCOUNTER: ICD-10-CM

## 2025-04-08 PROCEDURE — 97110 THERAPEUTIC EXERCISES: CPT | Mod: GP | Performed by: PHYSICAL THERAPIST

## 2025-04-08 PROCEDURE — 97140 MANUAL THERAPY 1/> REGIONS: CPT | Mod: GP | Performed by: PHYSICAL THERAPIST

## 2025-04-08 NOTE — PROGRESS NOTES
PHYSICAL THERAPY   TREATMENT NOTE  Patient Name:  Mono Francis   Patient MRN: [id number]  Date: 4/8/2025    Time Calculation  Start Time: 0900  Stop Time: 0945  Time Calculation (min): 45 min    Insurance:  Insurance Type: [facility]  Visit number: Visit count could not be calculated. Make sure you are using a visit which is associated with an episode.    Approved # of visits 16  Authorization Needed: Yes  Cert Date Ends On: 05/7/25  PT Reassessment: 4/4/25    General   Reason for visit: R shoulder strain   DOI 12/28/24  Referred by: Maikol Snyder, APRN-CNP  S46.911A    Therapy diagnoses:   Problem List Items Addressed This Visit             ICD-10-CM       Other    Strain of unspecified muscle, fascia and tendon at shoulder and upper arm level, right arm, initial encounter S46.911A         Assessment: Pt overall doing well and experiencing lower R shoulder pain levels, but continued difficulty with horizontal abduction and shoulder abduction > 90 deg movements. Fatigues fast with elbow plank exercise and wall ball flexion/ abd circles. Requires exercise frequency cues and encouragement for more difficult exercises. R shoulder muscle spasm with shoulder abduction PROM during manual therapy that decreased post STM. Encouraged to try slowly increasing lifting activities at work when pain is low to see how much he can tolerate. Decreased frequency of visits due to pt doing well at this time with good HEP compliance, as well as limited time with his busy work schedule.    Plan:   Continue to address R UE ROM, flexibility, and strength as tolerated. Manual therapy prn to decrease post-session pain. Initiate lifting activities.  1x/wk for 4-6 weeks.      Subjective:  Pt exhausted and working 70 hours a week. Pt shoulder doing well but certain movements still make it worse like horizontal abduction. Pt feels 80% better. Hasn't tried lifting heavy objects yet. Intermittent shoulder pain in morning, but feels alright at end of  "work day. Washing back and doing ADLs doesn't bother.     - Pain (0-10): 0/10 Right anterior shoulder and peeks from 3-6/10 depending on movement     Precautions:  PMH: none  Fall Risk: none    Objective:  - Access Code: [email or web address]    Treatment Performed:   Therapeutic Exercise:   - UBE L3 x 3' for / 3' rev  - incline push ups plus on counter 2 x 10  - wall towel circles R CW/ CCW x 10 each way  - wall red ball circles flex and abd 90 deg 5 CW/CCW x 5 each position R  - standing B shoulder flex with green resisted horizontal abd 2 x 10  - quadruped serratus push x 10  - elbow plank serratus push 5\" x 5    Manual Therapy:  STM/TPR R pec, UT  PROM R shoulder flex, abd, ER    Patient was seen under the guidance of a licensed physical therapist who made all clinical decisions.   Karissa Brito, SPT         PT Therapeutic Procedures Time Entry  Manual Therapy Time Entry: 10  Therapeutic Exercise Time Entry: 35                 "

## 2025-04-11 ENCOUNTER — APPOINTMENT (OUTPATIENT)
Dept: PHYSICAL THERAPY | Facility: CLINIC | Age: 30
End: 2025-04-11
Payer: COMMERCIAL

## 2025-04-15 ENCOUNTER — TREATMENT (OUTPATIENT)
Dept: PHYSICAL THERAPY | Facility: CLINIC | Age: 30
End: 2025-04-15
Payer: COMMERCIAL

## 2025-04-15 DIAGNOSIS — S46.911A STRAIN OF UNSPECIFIED MUSCLE, FASCIA AND TENDON AT SHOULDER AND UPPER ARM LEVEL, RIGHT ARM, INITIAL ENCOUNTER: ICD-10-CM

## 2025-04-15 PROCEDURE — 97110 THERAPEUTIC EXERCISES: CPT | Mod: GP | Performed by: PHYSICAL THERAPIST

## 2025-04-15 NOTE — PROGRESS NOTES
PHYSICAL THERAPY   TREATMENT NOTE  Patient Name:  Mono Francis   Patient MRN: [id number]  Date: 4/15/2025    Time Calculation  Start Time: 0905  Stop Time: 0943  Time Calculation (min): 38 min    Insurance:  Insurance Type: [facility]  Visit number: 3 in this cert   Approved # of visits 16  Authorization Needed: Yes  Cert Date: 3/12-5/7/25    General   Reason for visit: R shoulder strain   DOI 12/28/24  Referred by: Maikol Snyder, APRN-CNP  S46.911A    Therapy diagnoses:   Problem List Items Addressed This Visit             ICD-10-CM    Strain of unspecified muscle, fascia and tendon at shoulder and upper arm level, right arm, initial encounter S46.911A       Assessment: Pt progressing well and demonstrates no increase in shoulder pain levels after exercises - able to produce end range R shoulder motion with no difficulty, except horizontal abduction at 90 degrees. Requires moderate encouragement, exercise frequency, and form cues during session. Challenged particularly by core stability in planking and quadruped activities. Able to tolerate lifting 30# in crate with no difficulty and would benefit from progressing functional activities, core strength, and scapular stability.     Plan:   Continue to address R UE ROM, flexibility, and strength as tolerated. Manual therapy prn to decrease post-session pain. Continue working on lifting activities, core strength, and scapular stabilization exercises. Continue open book stretch for horizontal abduction.       Subjective:  Pt exhausted and working all last night. Tried lifting light objects at work and shoulder felt alright. Pt unable to recall how he felt after last session. No shoulder pain at end of session with end range movements, but still tightness with end range horizontal abduction.     - Pain (0-10): 1/10 Right anterior shoulder     Precautions:  PMH: none  Fall Risk: none    Objective:  - Access Code: [email or web address]    Treatment Performed:   Therapeutic Exercise:   - UBE  "L3 x 3' for / 3' rev  - child's pose on barre 10\" x 5  - doorway stretch mid level 2 x 30\"  - elbow plank dolphin/ serratus push x 15  - primal press up with TA cue 10\" x 10  - supine arm bar 5# kettlebell x 15 R/L  - single arm flexion with green band resisted horizontal abd with PT assist R 2 x 10  - lifting crate activity 30# from shuttle to top of bookcase 3'  - supine open book stretch to the R x 10    Patient was seen under the guidance of a licensed physical therapist who made all clinical decisions.   Karissa Brito, SPT       PT Therapeutic Procedures Time Entry  Therapeutic Exercise Time Entry: 38                 "

## 2025-04-18 ENCOUNTER — APPOINTMENT (OUTPATIENT)
Dept: PHYSICAL THERAPY | Facility: CLINIC | Age: 30
End: 2025-04-18
Payer: COMMERCIAL

## 2025-04-22 ENCOUNTER — TREATMENT (OUTPATIENT)
Dept: PHYSICAL THERAPY | Facility: CLINIC | Age: 30
End: 2025-04-22
Payer: COMMERCIAL

## 2025-04-22 DIAGNOSIS — S46.911A STRAIN OF UNSPECIFIED MUSCLE, FASCIA AND TENDON AT SHOULDER AND UPPER ARM LEVEL, RIGHT ARM, INITIAL ENCOUNTER: ICD-10-CM

## 2025-04-22 PROCEDURE — 97140 MANUAL THERAPY 1/> REGIONS: CPT | Mod: GP | Performed by: PHYSICAL THERAPIST

## 2025-04-22 PROCEDURE — 97110 THERAPEUTIC EXERCISES: CPT | Mod: GP | Performed by: PHYSICAL THERAPIST

## 2025-04-22 NOTE — PROGRESS NOTES
PHYSICAL THERAPY   TREATMENT NOTE  Patient Name:  Mono Francis   Patient MRN: [id number]  Date: 4/22/2025    Time Calculation  Start Time: 0820  Stop Time: 0902  Time Calculation (min): 42 min    Insurance:  Insurance Type: [facility]  Visit number: 4 in this cert   Approved # of visits 16  Authorization Needed: Yes  Cert Date: 3/12-5/7/25    General   Reason for visit: R shoulder strain   DOI 12/28/24  Referred by: Maikol Snyder, APRN-CNP  S46.911A    Therapy diagnoses:   Problem List Items Addressed This Visit           ICD-10-CM    Strain of unspecified muscle, fascia and tendon at shoulder and upper arm level, right arm, initial encounter S46.911A         Assessment: Pt progressing well and demonstrates no increase in shoulder pain levels after exercises. Requires moderate encouragement, exercise frequency, and form cues during session. Pt continues to have difficulty with D2 extension with R shoulder, but able to tolerate band better than cable column. Decrease in pain levels and pec high tone post manual therapy. Offered pt mental health resources, which he declined as he is talking to a mental health professional already.    Plan:   Continue to address R UE ROM, flexibility, and strength as tolerated. Manual therapy prn to decrease post-session pain. Continue working on lifting activities, core strength, and scapular stabilization exercises. Continue open book stretch for horizontal abduction.       Subjective:  Pt doesn't feel well due to allergies. Pt doesn't recall how he felt after last session, but no shoulder discomfort. Back is bothering him more today. Lifted keg ~45-50 lbs at work with no difficulty. Pt reported that he didn't sleep well. Pt reported not feeling mentally stable and has a lot of work stress, but talking to professional.   - Pain (0-10): 0/10 Right anterior shoulder     Precautions:  PMH: none  Fall Risk: none    Objective:  - Access Code: [email or web address]    Treatment Performed:   Therapeutic  "Exercise:   - UBE L3 x 3' for / 3' rev  - elbow plank with rock x 10  - primal press up with TA cue 10\" x 10  - primal press up with blazepod taps 4  - serratus kettlebell 10# arm bar 2 x 8 R  - D2 extension band cable 2.5 x 3*  - cheerleaders yellow band x 20 (added to HEP with red band)  - farmer carries 20# kettlebell in R hand walk around clinic 2'  - B ball toss rebound overhead throw weighted blue ball x 10  - supine open book stretch to the R 4 x 20\"  - child's pose 2 x 30\"    Manual Therapy: at end  STM R UT, pec minor, anterior shoulder  GH distraction grade 1-2    Patient was seen under the guidance of a licensed physical therapist who made all clinical decisions.   Karissa Brito, SPT       PT Therapeutic Procedures Time Entry  Manual Therapy Time Entry: 8  Therapeutic Exercise Time Entry: 34                 "

## 2025-04-25 ENCOUNTER — APPOINTMENT (OUTPATIENT)
Dept: PHYSICAL THERAPY | Facility: CLINIC | Age: 30
End: 2025-04-25
Payer: COMMERCIAL

## 2025-04-29 ENCOUNTER — TREATMENT (OUTPATIENT)
Dept: PHYSICAL THERAPY | Facility: CLINIC | Age: 30
End: 2025-04-29
Payer: COMMERCIAL

## 2025-04-29 DIAGNOSIS — S46.911A STRAIN OF UNSPECIFIED MUSCLE, FASCIA AND TENDON AT SHOULDER AND UPPER ARM LEVEL, RIGHT ARM, INITIAL ENCOUNTER: ICD-10-CM

## 2025-04-29 PROCEDURE — 97110 THERAPEUTIC EXERCISES: CPT | Mod: GP | Performed by: PHYSICAL THERAPIST

## 2025-04-29 PROCEDURE — 97140 MANUAL THERAPY 1/> REGIONS: CPT | Mod: GP | Performed by: PHYSICAL THERAPIST

## 2025-04-29 NOTE — PROGRESS NOTES
PHYSICAL THERAPY   TREATMENT NOTE  Patient Name:  Mono Francis   Patient MRN: [id number]  Date: 4/29/2025    Time Calculation  Start Time: 0815  Stop Time: 0858  Time Calculation (min): 43 min    Insurance:  Insurance Type: [facility]  Visit number: 5 in this cert   Approved # of visits 16  Authorization Needed: Yes  Cert Date: 3/12-5/7/25    General   Reason for visit: R shoulder strain   DOI 12/28/24  Referred by: Maikol Snyder, APRN-CNP  S46.911A    Therapy diagnoses:   Problem List Items Addressed This Visit           ICD-10-CM    Strain of unspecified muscle, fascia and tendon at shoulder and upper arm level, right arm, initial encounter S46.911A     Assessment: Pt progressing well and demonstrates improved tolerance for D2 extension cheerleader exercise. Pt R subscap high tone and tender but decreased post manual. Difficulty with mid rows and prone swims due to shoulder discomfort, but tolerated modification to exercises. Progressed cheerleaders for HEP and educated on using theracane and tennis ball for subscapular self release and increasing sleep as able.     Plan:   Continue to address R UE ROM, flexibility, and strength as tolerated. Manual therapy prn to decrease post-session pain. Continue working on lifting activities, core strength, and scapular stabilization exercises. Continue open book stretch for horizontal abduction.       Subjective:  Pt had a stressful night. Doesn't recall how shoulder felt after last session. Sometimes has shoulder pain when doing cheerleaders, but doesn't last after. No pain with cheerleaders, but sensitive with mid rows using 10# and during prone swims from 0-90 abduction.  - Pain (0-10): 0/10 Right anterior shoulder     Precautions:  PMH: none  Fall Risk: none    Objective:  - Access Code: [email or web address]    Treatment Performed:   Therapeutic Exercise:   - UBE L3 x 3' for / 3' rev  - D2 banded cheerleader yellow  x 10  - D2 banded cheerleader red x 10  - R D2 extension red x 15 before  and after manual   - manual  - shoulder extension kneeling on airex red 2 x 10  -  carries 10# kettlebell in R hand walk around clinic 2'  - shoulder scaption with resisted ER red 2 x 10  - prone swims x 5*  - prone Y's x 10 B  - T to Y x 10 B  - quadruped R mid rows 5# kettle 2 x 10    Manual Therapy: at end  STM R pec minor, subscapularis  - educated on using tennis ball and theracane for self subscapularis massage    Patient was seen under the guidance of a licensed physical therapist who made all clinical decisions.   Karissa Brito, SPT       PT Therapeutic Procedures Time Entry  Manual Therapy Time Entry: 15  Therapeutic Exercise Time Entry: 28

## 2025-05-02 ENCOUNTER — APPOINTMENT (OUTPATIENT)
Dept: PHYSICAL THERAPY | Facility: CLINIC | Age: 30
End: 2025-05-02
Payer: COMMERCIAL

## 2025-05-05 ENCOUNTER — APPOINTMENT (OUTPATIENT)
Dept: PHYSICAL THERAPY | Facility: CLINIC | Age: 30
End: 2025-05-05
Payer: COMMERCIAL

## 2025-05-07 ENCOUNTER — TREATMENT (OUTPATIENT)
Dept: PHYSICAL THERAPY | Facility: CLINIC | Age: 30
End: 2025-05-07
Payer: COMMERCIAL

## 2025-05-07 DIAGNOSIS — S46.911A STRAIN OF UNSPECIFIED MUSCLE, FASCIA AND TENDON AT SHOULDER AND UPPER ARM LEVEL, RIGHT ARM, INITIAL ENCOUNTER: Primary | ICD-10-CM

## 2025-05-07 PROCEDURE — 97110 THERAPEUTIC EXERCISES: CPT | Mod: GP | Performed by: PHYSICAL THERAPIST

## 2025-05-07 NOTE — PROGRESS NOTES
PHYSICAL THERAPY   TREATMENT NOTE/RECHECK  Patient Name:  Mono Francis   Patient MRN: [id number]  Date: 5/7/2025    Time Calculation  Start Time: 1017  Stop Time: 1046  Time Calculation (min): 29 min    Insurance:  Insurance Type: [facility]  Visit number: 6 in this cert   Approved # of visits 16  Authorization Needed: Yes  Cert Date: 3/12-5/7/25    General   Reason for visit: R shoulder strain   DOI 12/28/24  Referred by: ISABEL Gandhi-CNP  S46.911A    Therapy diagnoses:   Problem List Items Addressed This Visit           ICD-10-CM    Strain of unspecified muscle, fascia and tendon at shoulder and upper arm level, right arm, initial encounter - Primary S46.911A       Assessment: Pt progressing well at this time with significantly improve shoulder ROM and strength. He does continue to note decreased R shoulder abduction strength with pain and poor posture, although both progressing towards goals. Encouraged him to return to gym and normal activities at this time and will follow up with PT to assess progress if he feels unable to get back to 100%.    Plan: Request date extension for [facility]. Continue every other week for a few sessions while patient attempts return to gym and full function.    Goals  1. Pt will be independent in HEP in 6 weeks - MET  2. Pt will report 0-2/10 R shoulder pain at rest and with activity in 6 weeks. - MET  3. Pt will demonstrate 5/5 B UE strength to return to lifting at work in 6 weeks - PROGRESSING  4. Pt will demonstrate full R shoulder AROM to improve overhead reaching in 6 weeks. - MET  5. Pt will report quick DASH score < 10% in 6 weeks. - MET      Subjective:  Feeling good, no pain since last time. 90% back to normal. Is lifting normally. Occasionally wakes up in pain if he sleeps wrong. Is doing his full job duties. Has not been back to the gym because of time and not wanting to reinjure himself.  - Pain (0-10): 0/10 Right anterior shoulder. 2/10 at worst    Precautions:  PMH:  "none  Fall Risk: none    Objective: IE // TODAY  - Access Code: [email or web address]   Active Range of Motion (R, L in degrees)  Cervical Sidebending WNL, WNL* // WNL B  Cervical Rotation WNL, WNL* // WNL B  Shoulder Flexion 115*, 163 // 155, 155  Shoulder Abduction 127*, 160 // 152, 155  Hand Up Back L2*, T6 // T9, T8     Strength (R, L MMT out of 5)  Shoulder Flexion 3*, 5 // 4+, 5  Shoulder Abduction 3*, 5 // 3+*, 5  Shoulder IR 4, 5 // 5, 5  Shoulder ER 4, 5 // 5, 5  Elbow Flexion 4*, 5 // 5, 5  Wrist Supination 4*, 5 // 5, 5    Outcome Measures  Quick DASH = 61% // 4.55%    Treatment Performed:   Therapeutic Exercise:   - UBE L4 x 3' for / 3' rev  - child's pose at barre x 30\"   - recheck  - overhead press 5# B x 10  - B shoulder abduction 5# x 15  - review of return to gym activities and symptom management         PT Therapeutic Procedures Time Entry  Therapeutic Exercise Time Entry: 29                 "

## 2025-05-14 ENCOUNTER — APPOINTMENT (OUTPATIENT)
Dept: PHYSICAL THERAPY | Facility: CLINIC | Age: 30
End: 2025-05-14
Payer: COMMERCIAL

## 2025-05-28 ENCOUNTER — TREATMENT (OUTPATIENT)
Dept: PHYSICAL THERAPY | Facility: CLINIC | Age: 30
End: 2025-05-28
Payer: COMMERCIAL

## 2025-05-28 DIAGNOSIS — S46.911A STRAIN OF UNSPECIFIED MUSCLE, FASCIA AND TENDON AT SHOULDER AND UPPER ARM LEVEL, RIGHT ARM, INITIAL ENCOUNTER: Primary | ICD-10-CM

## 2025-05-28 PROCEDURE — 97110 THERAPEUTIC EXERCISES: CPT | Mod: GP | Performed by: PHYSICAL THERAPIST

## 2025-05-28 NOTE — PROGRESS NOTES
"PHYSICAL THERAPY   TREATMENT NOTE/DISCHARGE  Patient Name:  Mono Francis   Patient MRN: [id number]  Date: 5/28/2025    Time Calculation  Start Time: 1100  Stop Time: 1122  Time Calculation (min): 22 min    Insurance:  Insurance Type: [facility]  Visit number: 7 in this cert   Approved # of visits 16  Authorization Needed: Yes  Cert Date: 3/12-5/7/25  *Molly Noriega left a detailed voicemail for Dimitri Bonilla at  [phone] [address] to request the end date be extended through today 5/28/25     General   Reason for visit: R shoulder strain   DOI 12/28/24  Referred by: Maikol Snyder, ISABEL-CNP  S46.911A    Therapy diagnoses:   Problem List Items Addressed This Visit           ICD-10-CM    Strain of unspecified muscle, fascia and tendon at shoulder and upper arm level, right arm, initial encounter - Primary S46.911A     Assessment: Pt progressing well at this time with significantly improved shoulder strength without pain. Doing well with near full function and return to gym.     Plan: Discharge with HEP      Subjective:  Feeling good, no pain since last time. Has been back to the gym a few times, doing slightly lighter weights and no free weights yet. Warming up with small free weights. Doesn't stretch at the end of his workouts, but does stretch throughout. Work has been okay without increased pain. Feels he is 99% back to normal.   - Pain (0-10): 0/10 R shoulder.    Precautions:  PMH: none  Fall Risk: none    Objective: - Access Code: [email or web address]   Strength (R, L MMT out of 5)  Shoulder Flexion 5, 5  Shoulder Abduction 5, 5    Treatment Performed:   Therapeutic Exercise:   - UBE L5 x 3' for / 3' rev  - dynamic UE x 30\" each: abduction, circles posterior/anterior, horiz abd, shoulder ER  - push ups triceps x 5  - inclined wide  push up x 10   - cable column 7.5# horizontal abduction x 10 R  - child's pose at barre x 30\"   - doorway pec stretch x 30\" mod/high         PT Therapeutic Procedures Time " Entry  Therapeutic Exercise Time Entry: 22